# Patient Record
Sex: FEMALE | Race: WHITE | Employment: UNEMPLOYED | URBAN - METROPOLITAN AREA
[De-identification: names, ages, dates, MRNs, and addresses within clinical notes are randomized per-mention and may not be internally consistent; named-entity substitution may affect disease eponyms.]

---

## 2021-11-23 ENCOUNTER — APPOINTMENT (OUTPATIENT)
Dept: CT IMAGING | Facility: CLINIC | Age: 31
End: 2021-11-23
Payer: OTHER GOVERNMENT

## 2021-11-23 ENCOUNTER — HOSPITAL ENCOUNTER (EMERGENCY)
Facility: CLINIC | Age: 31
Discharge: HOME | End: 2021-11-23
Attending: EMERGENCY MEDICINE
Payer: OTHER GOVERNMENT

## 2021-11-23 VITALS
OXYGEN SATURATION: 98 % | DIASTOLIC BLOOD PRESSURE: 87 MMHG | TEMPERATURE: 98.1 F | HEART RATE: 79 BPM | SYSTOLIC BLOOD PRESSURE: 129 MMHG | HEIGHT: 65 IN | RESPIRATION RATE: 14 BRPM

## 2021-11-23 DIAGNOSIS — R10.11 ABDOMINAL PAIN, RIGHT UPPER QUADRANT: Primary | ICD-10-CM

## 2021-11-23 LAB
-: ABNORMAL
ABSOLUTE EOS #: 0.3 K/UL (ref 0–0.4)
ABSOLUTE IMMATURE GRANULOCYTE: NORMAL K/UL (ref 0–0.3)
ABSOLUTE LYMPH #: 2.1 K/UL (ref 1–4.8)
ABSOLUTE MONO #: 0.5 K/UL (ref 0.1–1.2)
ALBUMIN SERPL-MCNC: 5.3 G/DL (ref 3.5–5.2)
ALBUMIN/GLOBULIN RATIO: 1.9 (ref 1–2.5)
ALP BLD-CCNC: 101 U/L (ref 35–104)
ALT SERPL-CCNC: 16 U/L (ref 5–33)
AMORPHOUS: ABNORMAL
ANION GAP SERPL CALCULATED.3IONS-SCNC: 12 MMOL/L (ref 9–17)
AST SERPL-CCNC: 15 U/L
BACTERIA: ABNORMAL
BASOPHILS # BLD: 0 % (ref 0–2)
BASOPHILS ABSOLUTE: 0 K/UL (ref 0–0.2)
BILIRUB SERPL-MCNC: 0.2 MG/DL (ref 0.3–1.2)
BILIRUBIN DIRECT: <0.08 MG/DL
BILIRUBIN URINE: NEGATIVE
BILIRUBIN, INDIRECT: ABNORMAL MG/DL (ref 0–1)
BUN BLDV-MCNC: 19 MG/DL (ref 6–20)
BUN/CREAT BLD: NORMAL (ref 9–20)
CALCIUM SERPL-MCNC: 8.6 MG/DL (ref 8.6–10.4)
CASTS UA: ABNORMAL /LPF (ref 0–2)
CHLORIDE BLD-SCNC: 103 MMOL/L (ref 98–107)
CO2: 25 MMOL/L (ref 20–31)
COLOR: YELLOW
COMMENT UA: ABNORMAL
CREAT SERPL-MCNC: 0.7 MG/DL (ref 0.5–0.9)
CRYSTALS, UA: ABNORMAL /HPF
DIFFERENTIAL TYPE: NORMAL
EOSINOPHILS RELATIVE PERCENT: 3 % (ref 1–4)
EPITHELIAL CELLS UA: ABNORMAL /HPF (ref 0–5)
GFR AFRICAN AMERICAN: >60 ML/MIN
GFR NON-AFRICAN AMERICAN: >60 ML/MIN
GFR SERPL CREATININE-BSD FRML MDRD: NORMAL ML/MIN/{1.73_M2}
GFR SERPL CREATININE-BSD FRML MDRD: NORMAL ML/MIN/{1.73_M2}
GLOBULIN: ABNORMAL G/DL (ref 1.5–3.8)
GLUCOSE BLD-MCNC: 88 MG/DL (ref 70–99)
GLUCOSE URINE: NEGATIVE
HCG QUALITATIVE: NEGATIVE
HCT VFR BLD CALC: 41.6 % (ref 36–46)
HEMOGLOBIN: 14.2 G/DL (ref 12–16)
IMMATURE GRANULOCYTES: NORMAL %
KETONES, URINE: NEGATIVE
LEUKOCYTE ESTERASE, URINE: ABNORMAL
LIPASE: 30 U/L (ref 13–60)
LYMPHOCYTES # BLD: 27 % (ref 24–44)
MCH RBC QN AUTO: 28.7 PG (ref 26–34)
MCHC RBC AUTO-ENTMCNC: 34.1 G/DL (ref 31–37)
MCV RBC AUTO: 84.1 FL (ref 80–100)
MONOCYTES # BLD: 6 % (ref 2–11)
MUCUS: ABNORMAL
NITRITE, URINE: NEGATIVE
NRBC AUTOMATED: NORMAL PER 100 WBC
OTHER OBSERVATIONS UA: ABNORMAL
PDW BLD-RTO: 13.3 % (ref 12.5–15.4)
PH UA: 6 (ref 5–8)
PLATELET # BLD: 246 K/UL (ref 140–450)
PLATELET ESTIMATE: NORMAL
PMV BLD AUTO: 7.6 FL (ref 6–12)
POTASSIUM SERPL-SCNC: 3.9 MMOL/L (ref 3.7–5.3)
PROTEIN UA: NEGATIVE
RBC # BLD: 4.95 M/UL (ref 4–5.2)
RBC # BLD: NORMAL 10*6/UL
RBC UA: ABNORMAL /HPF (ref 0–2)
RENAL EPITHELIAL, UA: ABNORMAL /HPF
SEG NEUTROPHILS: 64 % (ref 36–66)
SEGMENTED NEUTROPHILS ABSOLUTE COUNT: 5 K/UL (ref 1.8–7.7)
SODIUM BLD-SCNC: 140 MMOL/L (ref 135–144)
SPECIFIC GRAVITY UA: 1.01 (ref 1–1.03)
TOTAL PROTEIN: 8.1 G/DL (ref 6.4–8.3)
TRICHOMONAS: ABNORMAL
TURBIDITY: CLEAR
URINE HGB: NEGATIVE
UROBILINOGEN, URINE: NORMAL
WBC # BLD: 7.9 K/UL (ref 3.5–11)
WBC # BLD: NORMAL 10*3/UL
WBC UA: ABNORMAL /HPF (ref 0–5)
YEAST: ABNORMAL

## 2021-11-23 PROCEDURE — 80048 BASIC METABOLIC PNL TOTAL CA: CPT

## 2021-11-23 PROCEDURE — 99284 EMERGENCY DEPT VISIT MOD MDM: CPT

## 2021-11-23 PROCEDURE — 81001 URINALYSIS AUTO W/SCOPE: CPT

## 2021-11-23 PROCEDURE — 83690 ASSAY OF LIPASE: CPT

## 2021-11-23 PROCEDURE — 36415 COLL VENOUS BLD VENIPUNCTURE: CPT

## 2021-11-23 PROCEDURE — 74176 CT ABD & PELVIS W/O CONTRAST: CPT

## 2021-11-23 PROCEDURE — 80076 HEPATIC FUNCTION PANEL: CPT

## 2021-11-23 PROCEDURE — 84703 CHORIONIC GONADOTROPIN ASSAY: CPT

## 2021-11-23 PROCEDURE — 85025 COMPLETE CBC W/AUTO DIFF WBC: CPT

## 2021-11-23 RX ORDER — 0.9 % SODIUM CHLORIDE 0.9 %
1000 INTRAVENOUS SOLUTION INTRAVENOUS ONCE
Status: DISCONTINUED | OUTPATIENT
Start: 2021-11-23 | End: 2021-11-23 | Stop reason: HOSPADM

## 2021-11-23 RX ORDER — SODIUM CHLORIDE 0.9 % (FLUSH) 0.9 %
10 SYRINGE (ML) INJECTION PRN
Status: DISCONTINUED | OUTPATIENT
Start: 2021-11-23 | End: 2021-11-23 | Stop reason: HOSPADM

## 2021-11-23 RX ORDER — 0.9 % SODIUM CHLORIDE 0.9 %
70 INTRAVENOUS SOLUTION INTRAVENOUS ONCE
Status: DISCONTINUED | OUTPATIENT
Start: 2021-11-23 | End: 2021-11-23 | Stop reason: HOSPADM

## 2021-11-23 ASSESSMENT — ENCOUNTER SYMPTOMS: ABDOMINAL PAIN: 1

## 2021-11-23 ASSESSMENT — PAIN DESCRIPTION - LOCATION: LOCATION: FLANK

## 2021-11-23 ASSESSMENT — PAIN DESCRIPTION - ORIENTATION: ORIENTATION: RIGHT

## 2021-11-23 ASSESSMENT — PAIN SCALES - GENERAL: PAINLEVEL_OUTOF10: 6

## 2021-11-23 ASSESSMENT — PAIN DESCRIPTION - PAIN TYPE: TYPE: ACUTE PAIN

## 2021-11-23 NOTE — ED PROVIDER NOTES
[facility]  [facility]  Phone: [phone]        Pt Name: Leni Vargas  MRN: [id number]  [place] 1990  Date of evaluation: 11/23/21      CHIEF COMPLAINT     Chief Complaint   Patient presents with    Flank Pain         HISTORY OF PRESENT ILLNESS  (Location/Symptom, Timing/Onset, Context/Setting, Quality, Duration, Modifying Factors, Severity.)    Leni Vargas is a 28 y.o. female who presents with abdominal pain. The patient states for the last 6 days she has had intermittent right upper quadrant abdominal pain that is worse with certain movements occasionally associated with nausea no vomiting no diarrhea no blood in the urine or stool no chest pain or shortness of breath no trauma reported the patient dates she is breast-feeding she is recently postpartum she is refusing any narcotic pain medications at this time      REVIEW OF SYSTEMS    (2-9 systems for level 4, 10 or more for level 5)     Review of Systems   Gastrointestinal: Positive for abdominal pain. All other systems reviewed and are negative. PAST MEDICAL HISTORY    has no past medical history on file. SURGICAL HISTORY      has no past surgical history on file. CURRENTMEDICATIONS       Previous Medications    No medications on file       ALLERGIES     has No Known Allergies. FAMILY HISTORY     has no family status information on file. family history is not on file. SOCIAL HISTORY          PHYSICAL EXAM    (up to 7 for level 4, 8 or more for level 5)   INITIAL VITALS:  height is 5' 5\" (1.651 m). Her oral temperature is 98.1 °F (36.7 °C). Her blood pressure is 129/87 and her pulse is 79. Her respiration is 14 and oxygen saturation is 98%. Physical Exam  Vitals and nursing note reviewed. Constitutional:       Appearance: Normal appearance. HENT:      Head: Normocephalic and atraumatic.    Eyes:      Conjunctiva/sclera: Conjunctivae normal.   Cardiovascular:      Rate and Rhythm: Normal rate and regular rhythm. Pulmonary:      Effort: Pulmonary effort is normal.      Breath sounds: Normal breath sounds. Abdominal:      General: Bowel sounds are normal. There is no distension. Palpations: Abdomen is soft. There is no mass. Tenderness: There is abdominal tenderness. There is guarding. There is no right CVA tenderness, left CVA tenderness or rebound. Hernia: No hernia is present. Comments: Tenderness with some guarding in the right upper quadrant   Musculoskeletal:         General: Normal range of motion. Cervical back: Normal range of motion and neck supple. Skin:     General: Skin is warm and dry. Findings: No rash. Neurological:      General: No focal deficit present. Mental Status: She is alert. DIFFERENTIAL DIAGNOSIS/ MDM:     We will establish an IV check labs I did attempt to order an ultrasound of the gallbladder but her ultrasound tech is already left for the day so we will get a CT of the abdomen and pelvis    DIAGNOSTIC RESULTS         RADIOLOGY:        Interpretation per the Radiologist below, if available at the time of this note:    CT ABDOMEN PELVIS WO CONTRAST Additional Contrast? None (Final result)  Result time 11/23/21 18:40:04  Procedure changed from [facility] Additional Contrast? None  Final result by April Haas MD (11/23/21 18:40:04)                Impression:    No acute finding in the abdomen and pelvis on this unenhanced study. No radiopaque urolithiasis or hydroureteronephrosis on either side. Narrative:    EXAMINATION:   CT OF THE ABDOMEN AND PELVIS WITHOUT CONTRAST 11/23/2021 5:51 pm     TECHNIQUE:   CT of the abdomen and pelvis was performed without the administration of   intravenous contrast. Multiplanar reformatted images are provided for review.    Dose modulation, iterative reconstruction, and/or weight based adjustment of   the mA/kV was utilized to reduce the radiation dose to as low as reasonably   achievable. COMPARISON:   None. HISTORY:   ORDERING SYSTEM PROVIDED HISTORY: right upper abdominal pain   TECHNOLOGIST PROVIDED HISTORY:   right upper abdominal pain     Reason for Exam: Right flank pain x6 days s/p moving a heavy box. 4 months   post partum   Acuity: Acute   Type of Exam: Initial     FINDINGS:   Lower Chest: Lung bases clear. Organs: Unremarkable liver, spleen, pancreas, adrenals, and gallbladder on   this unenhanced study.  No radiopaque urolithiasis or hydroureteronephrosis   on either side. GI/Bowel: Bowel loops nonobstructed.  Normal appendix. Pelvis: No adnexal mass.  Grossly unremarkable urinary bladder. Peritoneum/Retroperitoneum: No free air or free fluid.  No adenopathy.  No   abdominal aortic aneurysm. Bones/Soft Tissues: Intact osseous structures.                    LABS:  Results for orders placed or performed during the hospital encounter of 33/26/40   Basic Metabolic Panel   Result Value Ref Range    Glucose 88 70 - 99 mg/dL    BUN 19 6 - 20 mg/dL    CREATININE 0.70 0.50 - 0.90 mg/dL    Bun/Cre Ratio NOT REPORTED 9 - 20    Calcium 8.6 8.6 - 10.4 mg/dL    Sodium 140 135 - 144 mmol/L    Potassium 3.9 3.7 - 5.3 mmol/L    Chloride 103 98 - 107 mmol/L    CO2 25 20 - 31 mmol/L    Anion Gap 12 9 - 17 mmol/L    GFR Non-African American >60 >60 mL/min    GFR African American >60 >60 mL/min    GFR Comment          GFR Staging NOT REPORTED    CBC Auto Differential   Result Value Ref Range    WBC 7.9 3.5 - 11.0 k/uL    RBC 4.95 4.0 - 5.2 m/uL    Hemoglobin 14.2 12.0 - 16.0 g/dL    Hematocrit 41.6 36 - 46 %    MCV 84.1 80 - 100 fL    MCH 28.7 26 - 34 pg    MCHC 34.1 31 - 37 g/dL    RDW 13.3 12.5 - 15.4 %    Platelets 773 843 - 612 k/uL    MPV 7.6 6.0 - 12.0 fL    NRBC Automated NOT REPORTED per 100 WBC    Differential Type NOT REPORTED     Seg Neutrophils 64 36 - 66 %    Lymphocytes 27 24 - 44 %    Monocytes 6 2 - 11 % Eosinophils % 3 1 - 4 %    Basophils 0 0 - 2 %    Immature Granulocytes NOT REPORTED 0 %    Segs Absolute 5.00 1.8 - 7.7 k/uL    Absolute Lymph # 2.10 1.0 - 4.8 k/uL    Absolute Mono # 0.50 0.1 - 1.2 k/uL    Absolute Eos # 0.30 0.0 - 0.4 k/uL    Basophils Absolute 0.00 0.0 - 0.2 k/uL    Absolute Immature Granulocyte NOT REPORTED 0.00 - 0.30 k/uL    WBC Morphology NOT REPORTED     RBC Morphology NOT REPORTED     Platelet Estimate NOT REPORTED    Hepatic Function Panel   Result Value Ref Range    Albumin 5.3 (H) 3.5 - 5.2 g/dL    Alkaline Phosphatase 101 35 - 104 U/L    ALT 16 5 - 33 U/L    AST 15 <32 U/L    Total Bilirubin 0.20 (L) 0.3 - 1.2 mg/dL    Bilirubin, Direct <0.08 <0.31 mg/dL    Bilirubin, Indirect Can not be calculated 0.00 - 1.00 mg/dL    Total Protein 8.1 6.4 - 8.3 g/dL    Globulin NOT REPORTED 1.5 - 3.8 g/dL    Albumin/Globulin Ratio 1.9 1.0 - 2.5   Lipase   Result Value Ref Range    Lipase 30 13 - 60 U/L   HCG Qualitative, Serum   Result Value Ref Range    hCG Qual NEGATIVE NEGATIVE   Urinalysis Reflex to Culture   Result Value Ref Range    Color, UA Yellow Yellow    Turbidity UA Clear Clear    Glucose, Ur NEGATIVE NEGATIVE    Bilirubin Urine NEGATIVE NEGATIVE    Ketones, Urine NEGATIVE NEGATIVE    Specific Gravity, UA 1.015 1.005 - 1.030    Urine Hgb NEGATIVE NEGATIVE    pH, UA 6.0 5.0 - 8.0    Protein, UA NEGATIVE NEGATIVE    Urobilinogen, Urine Normal Normal    Nitrite, Urine NEGATIVE NEGATIVE    Leukocyte Esterase, Urine MODERATE (A) NEGATIVE    Urinalysis Comments NOT REPORTED    Microscopic Urinalysis   Result Value Ref Range    -          WBC, UA 2 TO 5 0 - 5 /HPF    RBC, UA None 0 - 2 /HPF    Casts UA NOT REPORTED 0 - 2 /LPF    Crystals, UA NOT REPORTED None /HPF    Epithelial Cells UA 2 TO 5 0 - 5 /HPF    Renal Epithelial, UA NOT REPORTED 0 /HPF    Bacteria, UA None None    Mucus, UA NOT REPORTED None    Trichomonas, UA NOT REPORTED None    Amorphous, UA NOT REPORTED None    Other Observations UA (A) NOT REQ. Utilizing a urinalysis as the only screening method to exclude a potential uropathogen can be unreliable in many patient populations. Rapid screening tests are less sensitive than culture and if UTI is a clinical possibility, culture should be considered despite a negative urinalysis. Yeast, UA NOT REPORTED None           EMERGENCY DEPARTMENT COURSE:   Vitals:    Vitals:    11/23/21 1636   BP: 129/87   Pulse: 79   Resp: 14   Temp: 98.1 °F (36.7 °C)   TempSrc: Oral   SpO2: 98%   Height: 5' 5\" (1.651 m)     -------------------------  BP: 129/87, Temp: 98.1 °F (36.7 °C), Pulse: 79, Resp: 14      RE-EVALUATION:  Lab Work and CT are both unremarkable I did offer admission to hospital setting which the patient refuses the patient states that she will work this up as an outpatient states she did have a previous family doctor in the area that she will call in order to get the ultrasound of the gallbladder which we were not able to obtain here as our tech is already left for the day I am recommending that she return to the ER tomorrow during daytime hours if she cannot get into her family doctor she is to immediately return to the ER for increasing pain fever vomiting or other concerns the patient has no peritoneal findings therefore I do feel it is reasonable for her to attempt to work this as an outpatient    PROCEDURES:  None    FINAL IMPRESSION      1. Abdominal pain, right upper quadrant          DISPOSITION/PLAN   DISPOSITION        CONDITION ON DISPOSITION:   Stable    PATIENT REFERRED TO:    [facility] Calling [phone]  Call in 1 day        DISCHARGE MEDICATIONS:  New Prescriptions    No medications on file       (Please note that portions of this note were completed with a voicerecognition program.  Efforts were made to edit the dictations but occasionally words are mis-transcribed.)    Marlene Drew MD,, MD, F.A.C.E.P.   Attending Emergency Medicine Physician Bon Jimenez MD  11/23/21 [id number]

## 2021-11-23 NOTE — ED TRIAGE NOTES
Right flank pain x 6 days after moving a heavy box. Had a baby 4 months ago, is breast feeding currently.

## 2021-11-24 ENCOUNTER — HOSPITAL ENCOUNTER (OUTPATIENT)
Dept: ULTRASOUND IMAGING | Facility: CLINIC | Age: 31
Discharge: HOME | End: 2021-11-26
Payer: OTHER GOVERNMENT

## 2021-11-24 DIAGNOSIS — R10.11 ABDOMINAL PAIN, RIGHT UPPER QUADRANT: ICD-10-CM

## 2021-11-24 PROCEDURE — 76705 ECHO EXAM OF ABDOMEN: CPT

## 2022-12-23 ENCOUNTER — HOSPITAL ENCOUNTER (EMERGENCY)
Facility: CLINIC | Age: 32
Discharge: HOME | End: 2022-12-23
Attending: EMERGENCY MEDICINE
Payer: OTHER GOVERNMENT

## 2022-12-23 VITALS
DIASTOLIC BLOOD PRESSURE: 81 MMHG | SYSTOLIC BLOOD PRESSURE: 135 MMHG | TEMPERATURE: 97.8 F | BODY MASS INDEX: 24.99 KG/M2 | HEIGHT: 65 IN | HEART RATE: 87 BPM | RESPIRATION RATE: 16 BRPM | OXYGEN SATURATION: 97 % | WEIGHT: 150 LBS

## 2022-12-23 DIAGNOSIS — J01.01 ACUTE RECURRENT MAXILLARY SINUSITIS: Primary | ICD-10-CM

## 2022-12-23 PROCEDURE — 2500000003 HC RX 250 WO HCPCS: Performed by: EMERGENCY MEDICINE

## 2022-12-23 PROCEDURE — 99282 EMERGENCY DEPT VISIT SF MDM: CPT

## 2022-12-23 RX ORDER — KETOROLAC TROMETHAMINE 30 MG/ML
30 INJECTION, SOLUTION INTRAMUSCULAR; INTRAVENOUS ONCE
Status: DISCONTINUED | OUTPATIENT
Start: 2022-12-23 | End: 2022-12-24 | Stop reason: HOSPADM

## 2022-12-23 ASSESSMENT — ENCOUNTER SYMPTOMS
RESPIRATORY NEGATIVE: 1
EYES NEGATIVE: 1
ALLERGIC/IMMUNOLOGIC NEGATIVE: 1
GASTROINTESTINAL NEGATIVE: 1

## 2022-12-24 PROCEDURE — 2500000003 HC RX 250 WO HCPCS: Performed by: EMERGENCY MEDICINE

## 2022-12-24 RX ADMIN — PHENYLEPHRINE HYDROCHLORIDE 1 SPRAY: 0.5 SPRAY NASAL at 00:04

## 2022-12-24 NOTE — ED TRIAGE NOTES
Patient states she has had sinus congestion since December 5th. Patient denies fever. Patient denies cough. Patient visiting in-laws from [facility].

## 2022-12-24 NOTE — ED PROVIDER NOTES
eMERGENCY dEPARTMENT eNCOUnter      Pt Name: Ayad Black  MRN: [id number]  [place] 1990  Date of evaluation: 12/23/2022      CHIEF COMPLAINT       Chief Complaint   Patient presents with    Congestion     Nasal congestion since 12/5/2022          HISTORY OF PRESENT ILLNESS    Ayad Black is a 33 y.o. female who presents emergency department for evaluation of continuing sinus pressure above her frontal sinus and maxillary sinus on the right and left side respectively. She is afebrile nontoxic looking but is not currently taking decongestants I do not believe. REVIEW OF SYSTEMS         Review of Systems   Constitutional: Negative. HENT:  Positive for congestion. Eyes: Negative. Respiratory: Negative. Gastrointestinal: Negative. Endocrine: Negative. Genitourinary: Negative. Musculoskeletal: Negative. Skin: Negative. Allergic/Immunologic: Negative. Neurological: Negative. Hematological: Negative. Psychiatric/Behavioral: Negative. Behavioral problem: .adultphysicalexam.  '     has no past medical history on file. SURGICAL HISTORY      has a past surgical history that includes Tonsillectomy; Cervical biopsy w/ loop electrode excision; Nasal septum surgery; and Temporomandibular joint surgery. CURRENT MEDICATIONS       Previous Medications    No medications on file       ALLERGIES     is allergic to augmentin [amoxicillin-pot clavulanate]. FAMILY HISTORY     has no family status information on file. family history is not on file. SOCIAL HISTORY      reports that she has never smoked. She has never been exposed to tobacco smoke. She has never used smokeless tobacco. She reports that she does not drink alcohol. PHYSICAL EXAM     INITIAL VITALS:  height is 5' 5\" (1.651 m) and weight is 68 kg (150 lb). Her oral temperature is 97.8 °F (36.6 °C). Her blood pressure is 135/81 and her pulse is 87. Her respiration is 16 and oxygen saturation is 97%. Constitutional: Alert, oriented x3, nontoxic, afebrile, answering questions appropriately, acting properly for age, in no acute distress  HEENT: Extraocular muscles intact, mucus membranes moist, TMs clear bilaterally, no posterior pharyngeal erythema or exudates, Pupils equal, round, reactive to light,   Neck: Trachea midline, Supple without lymphadenopathy, no posterior midline neck tenderness to palpation  Cardiovascular: Regular rhythm and rate no S3, S4, or murmurs  Respiratory: Clear to auscultation bilaterally no wheezes, rhonchi, rales, no respiratory distress  Gastrointestinal: Soft, nontender, nondistended, positive bowel sounds. No rebound, rigidity, or guarding. Musculoskeletal: No extremity pain or swelling  Neurologic: Moving all 4 extremities without difficulty there are no gross focal neurologic deficits  Skin: Warm and dry    DIFFERENTIAL DIAGNOSIS/ MDM:   Sinusitis most likely. Patient will be treated with decongestants and ibuprofen. DIAGNOSTIC RESULTS     EKG: All EKG's are interpreted by the Emergency Department Physician who either signs or Co-signs this chart in the absence of a cardiologist.        Not indicated unless otherwise documented above    LABS:  No results found for this visit on 12/23/22.     Not indicated unless otherwise documented above    RADIOLOGY:   I reviewed the radiologist interpretations:  No orders to display       Not indicated unless otherwise documented above    EMERGENCY DEPARTMENT COURSE:     The patient was given the following medications:  Orders Placed This Encounter   Medications    phenylephrine (WM-SYNEPHRINE) 0.5 % nasal spray 1 spray    ketorolac (TORADOL) injection 30 mg        Vitals:    Vitals:    12/23/22 2204   BP: 135/81   Pulse: 87   Resp: 16   Temp: 97.8 °F (36.6 °C)   TempSrc: Oral   SpO2: 97%   Weight: 68 kg (150 lb)   Height: 5' 5\" (1.651 m)     -------------------------  /81   Pulse 87   Temp 97.8 °F (36.6 °C) (Oral)   Resp 16   Ht 5' 5\" (1.651 m)   Wt 68 kg (150 lb)   SpO2 97%   Breastfeeding Yes   BMI 24.96 kg/m²         I have reviewed the disposition diagnosis with the patient and or their family/guardian. I have answered their questions and given discharge instructions. They voiced understanding of these instructions and did not have any further questions or complaints. CRITICAL CARE:    None    CONSULTS:    None    PROCEDURES:    None      OARRS Report if indicated             FINAL IMPRESSION      1. Acute recurrent maxillary sinusitis          DISPOSITION/PLAN   DISPOSITION Decision To Discharge    I have reviewed the disposition diagnosis with the patient and or their family/guardian. I have answered their questions and given discharge instructions. They voiced understanding of these instructions and did not have any further questions or complaints. ReEvaluation: Patient most likely has sinusitis which may be allergic in nature we are going to go ahead and give her nasal spray encouraged her to do sinus rinses ibuprofen for pain and following up with a clinic doctor in a couple of days she stable for discharge home.   PATIENT REFERRED TO:    Urgent care or clinic doctor within 2 days        DISCHARGE MEDICATIONS:  New Prescriptions    No medications on file       (Please note that portions of this note were completed with a voice recognition program.  Efforts were made to edit the dictations but occasionally words are mis-transcribed.)    Fitz Middleton MD,, MD  Attending Emergency Physician           Fitz Middleton MD  12/23/22 [id number]

## 2024-07-12 ENCOUNTER — OFFICE VISIT (OUTPATIENT)
Dept: OBGYN CLINIC | Age: 34
End: 2024-07-12

## 2024-07-12 VITALS
HEIGHT: 65 IN | DIASTOLIC BLOOD PRESSURE: 64 MMHG | HEART RATE: 87 BPM | WEIGHT: 135 LBS | SYSTOLIC BLOOD PRESSURE: 102 MMHG | BODY MASS INDEX: 22.49 KG/M2

## 2024-07-12 DIAGNOSIS — Z01.419 WELL WOMAN EXAM: Primary | ICD-10-CM

## 2024-07-12 DIAGNOSIS — N94.2 VAGINISMUS: ICD-10-CM

## 2024-07-12 DIAGNOSIS — N93.9 ABNORMAL UTERINE BLEEDING (AUB): ICD-10-CM

## 2024-07-12 DIAGNOSIS — Z80.3 FAMILY HISTORY OF BREAST CANCER: ICD-10-CM

## 2024-07-12 DIAGNOSIS — N94.6 DYSMENORRHEA: ICD-10-CM

## 2024-07-12 PROCEDURE — 99385 PREV VISIT NEW AGE 18-39: CPT | Performed by: STUDENT IN AN ORGANIZED HEALTH CARE EDUCATION/TRAINING PROGRAM

## 2024-07-12 SDOH — ECONOMIC STABILITY: INCOME INSECURITY: HOW HARD IS IT FOR YOU TO PAY FOR THE VERY BASICS LIKE FOOD, HOUSING, MEDICAL CARE, AND HEATING?: NOT HARD AT ALL

## 2024-07-12 SDOH — ECONOMIC STABILITY: FOOD INSECURITY: WITHIN THE PAST 12 MONTHS, THE FOOD YOU BOUGHT JUST DIDN'T LAST AND YOU DIDN'T HAVE MONEY TO GET MORE.: NEVER TRUE

## 2024-07-12 SDOH — ECONOMIC STABILITY: HOUSING INSECURITY
IN THE LAST 12 MONTHS, WAS THERE A TIME WHEN YOU DID NOT HAVE A STEADY PLACE TO SLEEP OR SLEPT IN A SHELTER (INCLUDING NOW)?: NO

## 2024-07-12 SDOH — ECONOMIC STABILITY: FOOD INSECURITY: WITHIN THE PAST 12 MONTHS, YOU WORRIED THAT YOUR FOOD WOULD RUN OUT BEFORE YOU GOT MONEY TO BUY MORE.: NEVER TRUE

## 2024-07-12 ASSESSMENT — PATIENT HEALTH QUESTIONNAIRE - PHQ9
SUM OF ALL RESPONSES TO PHQ QUESTIONS 1-9: 0
SUM OF ALL RESPONSES TO PHQ QUESTIONS 1-9: 0
2. FEELING DOWN, DEPRESSED OR HOPELESS: NOT AT ALL
SUM OF ALL RESPONSES TO PHQ QUESTIONS 1-9: 0
SUM OF ALL RESPONSES TO PHQ9 QUESTIONS 1 & 2: 0
SUM OF ALL RESPONSES TO PHQ QUESTIONS 1-9: 0
1. LITTLE INTEREST OR PLEASURE IN DOING THINGS: NOT AT ALL

## 2024-07-12 NOTE — PROGRESS NOTES
PAST MEDICAL HISTORY:   has a past medical history of Vaginismus.    PAST SURGICAL HISTORY:   has a past surgical history that includes Tonsillectomy; Cervical biopsy w/ loop electrode excision; Nasal septum surgery; and Temporomandibular joint surgery.    ALLERGIES:  is allergic to latex and augmentin [amoxicillin-pot clavulanate].    MEDICATIONS:  Prior to Admission medications    Not on File       FAMILY HISTORY:  Family History of Breast, Ovarian, Colon or Uterine Cancer: Yes paternal grandmother and paternal aunt with breast cancer. Maternal aunt and grandmother. Paternal aunt diagnosed at age 28. Mom and Dad negative for BRCA   family history is not on file.    SOCIAL HISTORY:   reports that she has never smoked. She has never been exposed to tobacco smoke. She has never used smokeless tobacco. She reports that she does not currently use alcohol. She reports that she does not use drugs.      VITALS:  Vitals:    07/12/24 0831   BP: 102/64   Pulse: 87   Weight: 61.2 kg (135 lb)   Height: 1.651 m (5' 5\")                                                                                                                                                                          PHYSICAL EXAM:   General Appearance: Appears healthy.  Alert; in no acute distress.  Pleasant.  Skin: Skin color, texture, turgor normal. No rashes or lesions.  HEENT: normocephalic and atraumatic  Respiratory: no conversational dyspnea  Cardiovascular: normal rate and regular rhythm  Breast:  (Chest): normal appearance, no masses or tenderness, No nipple retraction or dimpling, No nipple discharge or bleeding, No axillary or supraclavicular adenopathy  Abdomen: soft, non-tender, non-distended, no right upper quadrant tenderness, and no CVA tenderness  Pelvic Exam:   Chaperone for Intimate Exam: Chaperone was offered and declined  External genitalia: normal general appearance without lesions. Normal appearing clitoris. Pinpoint pain

## 2025-01-16 SDOH — ECONOMIC STABILITY: TRANSPORTATION INSECURITY
IN THE PAST 12 MONTHS, HAS LACK OF TRANSPORTATION KEPT YOU FROM MEETINGS, WORK, OR FROM GETTING THINGS NEEDED FOR DAILY LIVING?: PATIENT DECLINED

## 2025-01-16 SDOH — ECONOMIC STABILITY: FOOD INSECURITY: WITHIN THE PAST 12 MONTHS, YOU WORRIED THAT YOUR FOOD WOULD RUN OUT BEFORE YOU GOT MONEY TO BUY MORE.: PATIENT DECLINED

## 2025-01-16 SDOH — ECONOMIC STABILITY: TRANSPORTATION INSECURITY
IN THE PAST 12 MONTHS, HAS THE LACK OF TRANSPORTATION KEPT YOU FROM MEDICAL APPOINTMENTS OR FROM GETTING MEDICATIONS?: PATIENT DECLINED

## 2025-01-16 SDOH — ECONOMIC STABILITY: FOOD INSECURITY: WITHIN THE PAST 12 MONTHS, THE FOOD YOU BOUGHT JUST DIDN'T LAST AND YOU DIDN'T HAVE MONEY TO GET MORE.: PATIENT DECLINED

## 2025-01-16 SDOH — ECONOMIC STABILITY: INCOME INSECURITY: IN THE LAST 12 MONTHS, WAS THERE A TIME WHEN YOU WERE NOT ABLE TO PAY THE MORTGAGE OR RENT ON TIME?: PATIENT DECLINED

## 2025-01-16 ASSESSMENT — PATIENT HEALTH QUESTIONNAIRE - PHQ9
1. LITTLE INTEREST OR PLEASURE IN DOING THINGS: NOT AT ALL
SUM OF ALL RESPONSES TO PHQ9 QUESTIONS 1 & 2: 0
SUM OF ALL RESPONSES TO PHQ QUESTIONS 1-9: 0
SUM OF ALL RESPONSES TO PHQ QUESTIONS 1-9: 0
2. FEELING DOWN, DEPRESSED OR HOPELESS: NOT AT ALL
SUM OF ALL RESPONSES TO PHQ9 QUESTIONS 1 & 2: 0
2. FEELING DOWN, DEPRESSED OR HOPELESS: NOT AT ALL
SUM OF ALL RESPONSES TO PHQ QUESTIONS 1-9: 0
SUM OF ALL RESPONSES TO PHQ QUESTIONS 1-9: 0
1. LITTLE INTEREST OR PLEASURE IN DOING THINGS: NOT AT ALL

## 2025-01-17 ENCOUNTER — OFFICE VISIT (OUTPATIENT)
Dept: OBGYN CLINIC | Age: 35
End: 2025-01-17
Payer: OTHER GOVERNMENT

## 2025-01-17 VITALS
HEIGHT: 65 IN | HEART RATE: 92 BPM | WEIGHT: 135 LBS | BODY MASS INDEX: 22.49 KG/M2 | DIASTOLIC BLOOD PRESSURE: 76 MMHG | SYSTOLIC BLOOD PRESSURE: 114 MMHG

## 2025-01-17 DIAGNOSIS — N94.2 VAGINISMUS: ICD-10-CM

## 2025-01-17 DIAGNOSIS — N93.9 ABNORMAL UTERINE BLEEDING (AUB): Primary | ICD-10-CM

## 2025-01-17 DIAGNOSIS — Z80.3 FAMILY HISTORY OF BREAST CANCER: ICD-10-CM

## 2025-01-17 PROBLEM — Z98.890 HISTORY OF LOOP ELECTRICAL EXCISION PROCEDURE (LEEP): Status: ACTIVE | Noted: 2025-01-17

## 2025-01-17 PROCEDURE — 99213 OFFICE O/P EST LOW 20 MIN: CPT | Performed by: STUDENT IN AN ORGANIZED HEALTH CARE EDUCATION/TRAINING PROGRAM

## 2025-01-17 NOTE — PROGRESS NOTES
PHYSICAL EXAM:   General Appearance: Appears healthy.  Alert; in no acute distress.  Pleasant.  Skin: Skin color, texture, turgor normal. No rashes or lesions.  HEENT: normocephalic and atraumatic  Respiratory: no conversational dyspnea  Cardiovascular: normal rate and regular rhythm  Abdomen: soft, non-tender, non-distended, no right upper quadrant tenderness, and no CVA tenderness  Pelvic Exam: defer to annual  Musculoskeletal: no gross abnormalities  Extremities: non-tender BLE and non-edematous  Psych:  oriented to time, place and person, mood and affect are within normal limits      ASSESSMENT & PLAN:    Hanane Lincoln is a 34 y.o. female  with AUB and dysmenorrhea   - Vitals stable   - Pt had some irregular/heavy cycles which seem to be triggered by increased stress   - Pt using natural methods to try to control her hormone levels   - Discussed risks associated with any exogeneous hormones, patient denies any use. She declines hormonal medications today   - Discussed natural remedies like red raspberry leaf tea   - Encourage follow up with PCP for autoimmune concerns   - Previous records reviewed, however these are mostly relating to her pregnancies. Will repeat ultrasound if indicated or additional labs   - Discussed precautions, continue close monitoring, return for well woman or sooner if symptoms worsen    [facility]x Breast Cancer   - Pt was previously referred to genetics, the patient no longer desires this or to undergo genetic testing    Vaginismus   - Pt has tried pelvic floor PT in the past with no improvement   - Pt not currently sexually active, will consider if she becomes active    Patient Active Problem List    Diagnosis Date Noted    History of loop electrical excision procedure (LEEP) 2025      Vaginismus 2024    Family history of breast cancer 2024     Pt does not want referral to genetics,

## 2025-07-23 ENCOUNTER — HOSPITAL ENCOUNTER (OUTPATIENT)
Age: 35
Setting detail: SPECIMEN
Discharge: HOME | End: 2025-07-23

## 2025-07-23 ENCOUNTER — OFFICE VISIT (OUTPATIENT)
Dept: OBGYN CLINIC | Age: 35
End: 2025-07-23
Payer: COMMERCIAL

## 2025-07-23 VITALS
HEIGHT: 65 IN | HEART RATE: 72 BPM | WEIGHT: 140 LBS | DIASTOLIC BLOOD PRESSURE: 72 MMHG | SYSTOLIC BLOOD PRESSURE: 116 MMHG | BODY MASS INDEX: 23.32 KG/M2

## 2025-07-23 DIAGNOSIS — Z11.51 ENCOUNTER FOR SCREENING FOR HUMAN PAPILLOMAVIRUS (HPV): ICD-10-CM

## 2025-07-23 DIAGNOSIS — Z98.890 HISTORY OF LOOP ELECTRICAL EXCISION PROCEDURE (LEEP): ICD-10-CM

## 2025-07-23 DIAGNOSIS — Z12.4 PAP SMEAR FOR CERVICAL CANCER SCREENING: ICD-10-CM

## 2025-07-23 DIAGNOSIS — Z91.89 INCREASED RISK OF BREAST CANCER: ICD-10-CM

## 2025-07-23 DIAGNOSIS — Z80.3 FAMILY HISTORY OF BREAST CANCER: ICD-10-CM

## 2025-07-23 DIAGNOSIS — Z01.419 WELL WOMAN EXAM WITH ROUTINE GYNECOLOGICAL EXAM: Primary | ICD-10-CM

## 2025-07-23 PROCEDURE — 99395 PREV VISIT EST AGE 18-39: CPT | Performed by: STUDENT IN AN ORGANIZED HEALTH CARE EDUCATION/TRAINING PROGRAM

## 2025-07-23 NOTE — PROGRESS NOTES
OB/GYN Annual Visit  [facility]     Hanane Lincoln  2025                       Primary Care Physician: No primary care provider on file.    CC:   Chief Complaint   Patient presents with    Annual Exam         HPI: Hanane Lincoln is a 35 y.o. female  here for an annual visit. She has no complaints today. She has anxiety about pelvic exam today.    Patient's last menstrual period was 07/10/2025 (exact date). She reports overall her cycles are normal other than when she has significant stress it will be off a few days. She has no interest in further workup or medication at this time.    Her bowel habits are regular. She denies any bloating.  She denies dysuria. She denies urinary leaking.  She denies vaginal discharge.  She is not currently sexually active. She reports light postcoital bleeding but denies pain with intercourse. She denies any breast complaints.    Depression Screen: Symptoms of decreased mood absent  Symptoms of anhedonia absent    REVIEW OF SYSTEMS:   Constitutional: negative fever, negative chills, negative weight changes   HEENT: negative visual disturbances, negative headaches, negative dizziness, negative hearing loss  Breast: Negative breast abnormalities, negative breast lumps, negative nipple discharge  Respiratory: negative dyspnea, negative cough, negative SOB  Cardiovascular: negative chest pain,  negative palpitations, negative arrhythmia, negative syncope   Gastrointestinal: negative abdominal pain, negative RUQ pain, negative N/V, negative diarrhea, negative constipation, negative bowel changes, negative heartburn   Genitourinary: negative pelvic pain, negative dysuria, negative hematuria, negative urinary incontinence, negative vaginal discharge, negative heavy vaginal bleeding  Dermatological: negative rash, negative pruritis, negative mole or other skin changes  Hematologic: negative bruising  Immunologic/Lymphatic: negative recent illness, negative

## 2025-07-25 ENCOUNTER — RESULTS FOLLOW-UP (OUTPATIENT)
Dept: OBGYN | Age: 35
End: 2025-07-25

## 2025-07-25 LAB
HPV I/H RISK 4 DNA CVX QL NAA+PROBE: NOT DETECTED
HPV SAMPLE: NORMAL
HPV, INTERPRETATION: NORMAL
HPV16 DNA CVX QL NAA+PROBE: NOT DETECTED
HPV18 DNA CVX QL NAA+PROBE: NOT DETECTED
SPECIMEN DESCRIPTION: NORMAL

## 2025-08-12 LAB — CYTOLOGY REPORT: NORMAL
